# Patient Record
(demographics unavailable — no encounter records)

---

## 2025-01-11 NOTE — HISTORY OF PRESENT ILLNESS
[de-identified] :  BHARTI SHRESTHA is a 69 year F who presents today to establish care with [facility] @ [place]. She reports that she has not seen a doctor in a 'few yrs'. She has several medical concerns RE:  1-her BP.  She says her home SBP readings were elevated to 200 on more than one occasion. She also was experiencing palpitations and was subsequently seen at [facility] for c/o palpitations.  2- She reports Bilateral shoulder pain for a 'while'. She says she took frequent daily Advil until about 2 mos ago. 3-'borderline' sugars previously

## 2025-01-11 NOTE — PLAN
[FreeTextEntry1] : ..,  THIS VISIT WAS PART OF A NEWLY ESTABLISHED RELATIONSHIP DESIGNED TO ADDRESS THE MAJORITY OF THE PATIENT'S HEALTH CARE NEEDS WITH CONSISTENCY OVER A LONG PERIOD OF TIME.

## 2025-01-11 NOTE — HEALTH RISK ASSESSMENT
[Never] : Never [With Patient/Caregiver] : , with patient/caregiver [Designated Healthcare Proxy] : Designated healthcare proxy [Name: ___] : Health Care Proxy's Name: [unfilled]  [Relationship: ___] : Relationship: [unfilled] [Never (0 pts)] : Never (0 points) [No] : In the past 12 months have you used drugs other than those required for medical reasons? No [1] : 2) Feeling down, depressed, or hopeless for several days (1) [PHQ-2 Negative - No further assessment needed] : PHQ-2 Negative - No further assessment needed [Fully functional (bathing, dressing, toileting, transferring, walking, feeding)] : Fully functional (bathing, dressing, toileting, transferring, walking, feeding) [Audit-CScore] : 0 [[id number]] : 2 [MammogramComments] : overdue- [AdvancecareDate] : 01/2025

## 2025-01-11 NOTE — PHYSICAL EXAM
[Normal Sclera/Conjunctiva] : normal sclera/conjunctiva [Normal Outer Ear/Nose] : the outer ears and nose were normal in appearance [No Lymphadenopathy] : no lymphadenopathy [Supple] : supple [Normal] : no respiratory distress, lungs were clear to auscultation bilaterally and no accessory muscle use [Normal Rate] : normal rate  [Regular Rhythm] : with a regular rhythm [Normal S1, S2] : normal S1 and S2 [No Edema] : there was no peripheral edema [Soft] : abdomen soft [Non Tender] : non-tender [Normal Supraclavicular Nodes] : no supraclavicular lymphadenopathy [Normal Anterior Cervical Nodes] : no anterior cervical lymphadenopathy [No CVA Tenderness] : no CVA  tenderness [No Spinal Tenderness] : no spinal tenderness [No Focal Deficits] : no focal deficits [Normal Gait] : normal gait [Speech Grossly Normal] : speech grossly normal [Normal Affect] : the affect was normal [Alert and Oriented x3] : oriented to person, place, and time [Normal Mood] : the mood was normal [de-identified] : limited ROM bilateral upper extremities. C/O pain on raising arms more than 90 degrees

## 2025-01-11 NOTE — PHYSICAL EXAM
[Normal Sclera/Conjunctiva] : normal sclera/conjunctiva [Normal Outer Ear/Nose] : the outer ears and nose were normal in appearance [No Lymphadenopathy] : no lymphadenopathy [Supple] : supple [Normal] : no respiratory distress, lungs were clear to auscultation bilaterally and no accessory muscle use [Normal Rate] : normal rate  [Regular Rhythm] : with a regular rhythm [Normal S1, S2] : normal S1 and S2 [No Edema] : there was no peripheral edema [Soft] : abdomen soft [Non Tender] : non-tender [Normal Supraclavicular Nodes] : no supraclavicular lymphadenopathy [Normal Anterior Cervical Nodes] : no anterior cervical lymphadenopathy [No CVA Tenderness] : no CVA  tenderness [No Spinal Tenderness] : no spinal tenderness [No Focal Deficits] : no focal deficits [Normal Gait] : normal gait [Speech Grossly Normal] : speech grossly normal [Normal Affect] : the affect was normal [Alert and Oriented x3] : oriented to person, place, and time [Normal Mood] : the mood was normal [de-identified] : limited ROM bilateral upper extremities. C/O pain on raising arms more than 90 degrees

## 2025-01-11 NOTE — HEALTH RISK ASSESSMENT
[Never] : Never [With Patient/Caregiver] : , with patient/caregiver [Designated Healthcare Proxy] : Designated healthcare proxy [Name: ___] : Health Care Proxy's Name: [unfilled]  [Relationship: ___] : Relationship: [unfilled] [Never (0 pts)] : Never (0 points) [No] : In the past 12 months have you used drugs other than those required for medical reasons? No [1] : 2) Feeling down, depressed, or hopeless for several days (1) [PHQ-2 Negative - No further assessment needed] : PHQ-2 Negative - No further assessment needed [Fully functional (bathing, dressing, toileting, transferring, walking, feeding)] : Fully functional (bathing, dressing, toileting, transferring, walking, feeding) [Audit-CScore] : 0 [[id number]] : 2 [MammogramComments] : overdue- [AdvancecareDate] : 01/2025

## 2025-01-11 NOTE — HISTORY OF PRESENT ILLNESS
[de-identified] :  BHARTI SHRESTHA is a 69 year F who presents today to [facility] with [facility] @ [place]. She reports that she has not seen a doctor in a 'few yrs'. She has several medical concerns RE:  1-her BP.  She says her home SBP readings were elevated to 200 on more than one occasion. She also was experiencing palpitations and was subsequently seen at [facility] for c/o palpitations.  2- She reports Bilateral shoulder pain for a 'while'. She says she took frequent daily Advil until about 2 mos ago. 3-'borderline' sugars previously

## 2025-01-11 NOTE — REVIEW OF SYSTEMS
[Patient Intake Form Reviewed] : Patient intake form was reviewed [Joint Pain] : joint pain [Joint Stiffness] : joint stiffness [Joint Swelling] : joint swelling [Negative] : Heme/Lymph

## 2025-01-11 NOTE — HISTORY OF PRESENT ILLNESS
[de-identified] :  BHARTI SHRESTHA is a 69 year F who presents today to establish care with [facility] @ [place]. She reports that she has not seen a doctor in a 'few yrs'. She has several medical concerns RE:  1-her BP.  She says her home SBP readings were elevated to 200 on more than one occasion. She also was experiencing palpitations and was subsequently seen at [facility] for c/o palpitations.  2- She reports Bilateral shoulder pain for a 'while'. She says she took frequent daily Advil until about 2 mos ago. 3-'borderline' sugars previously

## 2025-01-11 NOTE — PHYSICAL EXAM
[Normal Sclera/Conjunctiva] : normal sclera/conjunctiva [Normal Outer Ear/Nose] : the outer ears and nose were normal in appearance [No Lymphadenopathy] : no lymphadenopathy [Supple] : supple [Normal] : no respiratory distress, lungs were clear to auscultation bilaterally and no accessory muscle use [Normal Rate] : normal rate  [Regular Rhythm] : with a regular rhythm [Normal S1, S2] : normal S1 and S2 [No Edema] : there was no peripheral edema [Soft] : abdomen soft [Non Tender] : non-tender [Normal Supraclavicular Nodes] : no supraclavicular lymphadenopathy [Normal Anterior Cervical Nodes] : no anterior cervical lymphadenopathy [No CVA Tenderness] : no CVA  tenderness [No Spinal Tenderness] : no spinal tenderness [No Focal Deficits] : no focal deficits [Normal Gait] : normal gait [Speech Grossly Normal] : speech grossly normal [Normal Affect] : the affect was normal [Alert and Oriented x3] : oriented to person, place, and time [Normal Mood] : the mood was normal [de-identified] : limited ROM bilateral upper extremities. C/O pain on raising arms more than 90 degrees

## 2025-01-23 NOTE — HISTORY OF PRESENT ILLNESS
[de-identified] : BHARTI SHRESTHA is a 69 year F who presents today to f/u on her HTN. She is tolerating Norvasc @ 5 mg daily without adverse effects. She is also here to discuss recent labs. Her lipids proved to be quite elevated with Tchol of 266 and LDL of 189. While her fasting glucose was normal @ 98, her A1c was elevated to the prediabetic range @ 5.8%.

## 2025-01-23 NOTE — HISTORY OF PRESENT ILLNESS
[de-identified] : BHARTI SHRESTHA is a 69 year F who presents today to f/u on her HTN. She is tolerating Norvasc @ 5 mg daily without adverse effects. She is also here to discuss recent labs. Her lipids proved to be quite elevated with Tchol of 266 and LDL of 189. While her fasting glucose was normal @ 98, her A1c was elevated to the prediabetic range @ 5.8%.

## 2025-01-23 NOTE — PHYSICAL EXAM
[Normal] : no acute distress, well nourished, well developed and well-appearing [Normal Sclera/Conjunctiva] : normal sclera/conjunctiva [Normal Outer Ear/Nose] : the outer ears and nose were normal in appearance [No Respiratory Distress] : no respiratory distress  [No Accessory Muscle Use] : no accessory muscle use [Normal Gait] : normal gait [Speech Grossly Normal] : speech grossly normal [Normal Affect] : the affect was normal [Normal Mood] : the mood was normal

## 2025-01-23 NOTE — ADDENDUM
[FreeTextEntry1] : ,. ,.  THIS VISIT WAS PART OF AN ONGOING LONGITUDAL RELATIONSHIP DESIGNED TO ADDRESS THE MAJORITY OF THE PATIENT'S HEALTH CARE NEEDS WITH CONSISTENCY OVER A LONG PERIOD OF TIME.

## 2025-01-23 NOTE — HEALTH RISK ASSESSMENT
[Never] : Never [1] : 2) Feeling down, depressed, or hopeless for several days (1) [PHQ-2 Negative - No further assessment needed] : PHQ-2 Negative - No further assessment needed [[id number]] : 2

## 2025-02-03 NOTE — HISTORY OF PRESENT ILLNESS
[de-identified] : BHARTI SHRESTHA is a 69 year F who presents today to check her BP. Taking low dose Ziac- but reports not feeling well on this medication. She reports episodic dizziness and an associated heaviness in her legs. Leaded legs when walking upstairs. Pulses are in the high 40s and low 50s. Her home BP have improved a bit since she started taking this medication.

## 2025-02-03 NOTE — PHYSICAL EXAM
[Normal] : no acute distress, well nourished, well developed and well-appearing [Normal Sclera/Conjunctiva] : normal sclera/conjunctiva [Normal Outer Ear/Nose] : the outer ears and nose were normal in appearance [No Respiratory Distress] : no respiratory distress  [No Accessory Muscle Use] : no accessory muscle use [Normal Gait] : normal gait [Normal Affect] : the affect was normal [Normal Mood] : the mood was normal

## 2025-02-03 NOTE — PLAN
[FreeTextEntry1] : ,.  THIS VISIT WAS PART OF AN ONGOING LONGITUDAL RELATIONSHIP DESIGNED TO ADDRESS THE MAJORITY OF THE PATIENT'S HEALTH CARE NEEDS WITH CONSISTENCY OVER A LONG PERIOD OF TIME.

## 2025-02-22 NOTE — HISTORY OF PRESENT ILLNESS
[de-identified] : BHARTI HARVEYTREVORBRANDY is a 69 year F who presents today to check her BP. She did not tolerate Benicar HCT -developed a rash .Now changes to Zesteretic, feels well and says HOME BP readings are much improved. SBP in the 120s-130s since she started taking this medication.

## 2025-02-22 NOTE — HISTORY OF PRESENT ILLNESS
[de-identified] : BHARTI HARVEYTREVORBRANDY is a 69 year F who presents today to check her BP. She did not tolerate Benicar HCT -developed a rash .Now changes to Zesteretic, feels well and says HOME BP readings are much improved. SBP in the 120s-130s since she started taking this medication.

## 2025-02-22 NOTE — HISTORY OF PRESENT ILLNESS
[de-identified] : BHARTI HARVEYTREVORBRANDY is a 69 year F who presents today to check her BP. She did not tolerate Benicar HCT -developed a rash .Now changes to Zesteretic, feels well and says HOME BP readings are much improved. SBP in the 120s-130s since she started taking this medication.

## 2025-02-22 NOTE — PHYSICAL EXAM
[Normal Sclera/Conjunctiva] : normal sclera/conjunctiva [Normal Outer Ear/Nose] : the outer ears and nose were normal in appearance [Normal] : no respiratory distress, lungs were clear to auscultation bilaterally and no accessory muscle use [No Focal Deficits] : no focal deficits [Normal Gait] : normal gait [Speech Grossly Normal] : speech grossly normal [Normal Affect] : the affect was normal [Normal Mood] : the mood was normal [de-identified] : 146/84, 154/80

## 2025-02-22 NOTE — PAST MEDICAL HISTORY
[Postmenopausal] : postmenopausal
wheezing

## 2025-02-22 NOTE — PHYSICAL EXAM
[Normal Sclera/Conjunctiva] : normal sclera/conjunctiva [Normal Outer Ear/Nose] : the outer ears and nose were normal in appearance [Normal] : no respiratory distress, lungs were clear to auscultation bilaterally and no accessory muscle use [No Focal Deficits] : no focal deficits [Normal Gait] : normal gait [Speech Grossly Normal] : speech grossly normal [Normal Affect] : the affect was normal [Normal Mood] : the mood was normal [de-identified] : 146/84, 154/80

## 2025-02-22 NOTE — PHYSICAL EXAM
[Normal Sclera/Conjunctiva] : normal sclera/conjunctiva [Normal Outer Ear/Nose] : the outer ears and nose were normal in appearance [Normal] : no respiratory distress, lungs were clear to auscultation bilaterally and no accessory muscle use [No Focal Deficits] : no focal deficits [Normal Gait] : normal gait [Speech Grossly Normal] : speech grossly normal [Normal Affect] : the affect was normal [Normal Mood] : the mood was normal [de-identified] : 146/84, 154/80

## 2025-02-28 NOTE — PHYSICAL EXAM
[Normal] : no acute distress, well nourished, well developed and well-appearing [Normal Sclera/Conjunctiva] : normal sclera/conjunctiva [Normal Outer Ear/Nose] : the outer ears and nose were normal in appearance [No Respiratory Distress] : no respiratory distress  [No Accessory Muscle Use] : no accessory muscle use [Normal Rate] : normal rate  [Regular Rhythm] : with a regular rhythm [No Focal Deficits] : no focal deficits [Normal Gait] : normal gait [Speech Grossly Normal] : speech grossly normal [Normal Affect] : the affect was normal [Alert and Oriented x3] : oriented to person, place, and time [Normal Mood] : the mood was normal

## 2025-02-28 NOTE — HISTORY OF PRESENT ILLNESS
[FreeTextEntry8] : BHARTI SHRESTHA is a 69 year F with HTN and HLD who presents today with complaints of episodic bradycardia to the 40s. She says she felt 'funny' and decided to check her BP. The BP readings were normal, however, the pulse on the home monitor was in the 40s. She said this occurred twice in the last 2-3 days. She confirmed the pulse on a pulse oximeter that she owns, it too read low pulse.

## 2025-02-28 NOTE — HEALTH RISK ASSESSMENT
[Never (0 pts)] : Never (0 points) [No] : In the past 12 months have you used drugs other than those required for medical reasons? No [1] : 2) Feeling down, depressed, or hopeless for several days (1) [PHQ-2 Negative - No further assessment needed] : PHQ-2 Negative - No further assessment needed [Never] : Never [Audit-CScore] : 0 [[id number]] : 2

## 2025-03-22 NOTE — HISTORY OF PRESENT ILLNESS
[de-identified] : BHARTI SHRESTHA is a 69 year F who presents today for BP follow-up. She is currently on Zesteretic with pretty good BP control at home. She underwent lab testing to ensure that her electrolytes and renal function were in normal ranges on the ACEI/Diuretic combination. She has not addressed her lipids yet. I prescribed low dose Crestor as her ASCVD is in the intermediate risk @ >18%. Se is reluctant to start-read about dementia from statins. I have advised that dementia also is the result of untreated lipids from chronic microvascular disease too.

## 2025-03-22 NOTE — HISTORY OF PRESENT ILLNESS
[de-identified] : BHARTI SHRESTHA is a 69 year F who presents today for BP follow-up. She is currently on Zesteretic with pretty good BP control at home. She underwent lab testing to ensure that her electrolytes and renal function were in normal ranges on the ACEI/Diuretic combination. She has not addressed her lipids yet. I prescribed low dose Crestor as her ASCVD is in the intermediate risk @ >18%. Se is reluctant to start-read about dementia from statins. I have advised that dementia also is the result of untreated lipids from chronic microvascular disease too.

## 2025-03-22 NOTE — PHYSICAL EXAM
[Normal] : no acute distress, well nourished, well developed and well-appearing [Normal Sclera/Conjunctiva] : normal sclera/conjunctiva [Normal Outer Ear/Nose] : the outer ears and nose were normal in appearance [No Respiratory Distress] : no respiratory distress  [No Accessory Muscle Use] : no accessory muscle use [Grossly Normal Strength/Tone] : grossly normal strength/tone [Normal Gait] : normal gait [Speech Grossly Normal] : speech grossly normal [Normal Affect] : the affect was normal [Alert and Oriented x3] : oriented to person, place, and time [Normal Mood] : the mood was normal [Normal Rate] : normal rate  [Regular Rhythm] : with a regular rhythm